# Patient Record
Sex: FEMALE | Race: WHITE | URBAN - METROPOLITAN AREA
[De-identification: names, ages, dates, MRNs, and addresses within clinical notes are randomized per-mention and may not be internally consistent; named-entity substitution may affect disease eponyms.]

---

## 2022-07-26 ENCOUNTER — WEB ENCOUNTER (OUTPATIENT)
Dept: URBAN - METROPOLITAN AREA CLINIC 70 | Facility: CLINIC | Age: 72
End: 2022-07-26

## 2022-07-26 ENCOUNTER — LAB OUTSIDE AN ENCOUNTER (OUTPATIENT)
Dept: URBAN - METROPOLITAN AREA CLINIC 70 | Facility: CLINIC | Age: 72
End: 2022-07-26

## 2022-07-26 ENCOUNTER — DASHBOARD ENCOUNTERS (OUTPATIENT)
Age: 72
End: 2022-07-26

## 2022-07-26 ENCOUNTER — OFFICE VISIT (OUTPATIENT)
Dept: URBAN - METROPOLITAN AREA CLINIC 70 | Facility: CLINIC | Age: 72
End: 2022-07-26
Payer: MEDICARE

## 2022-07-26 VITALS
HEART RATE: 75 BPM | BODY MASS INDEX: 28.1 KG/M2 | HEIGHT: 63 IN | WEIGHT: 158.6 LBS | TEMPERATURE: 97.6 F | SYSTOLIC BLOOD PRESSURE: 150 MMHG | DIASTOLIC BLOOD PRESSURE: 82 MMHG

## 2022-07-26 DIAGNOSIS — Z80.0 FAMILY HX OF COLON CANCER: ICD-10-CM

## 2022-07-26 DIAGNOSIS — K21.9 GASTROESOPHAGEAL REFLUX DISEASE, UNSPECIFIED WHETHER ESOPHAGITIS PRESENT: ICD-10-CM

## 2022-07-26 DIAGNOSIS — R11.2 NAUSEA AND VOMITING, UNSPECIFIED VOMITING TYPE: ICD-10-CM

## 2022-07-26 PROCEDURE — 99204 OFFICE O/P NEW MOD 45 MIN: CPT

## 2022-07-26 RX ORDER — ESOMEPRAZOLE MAGNESIUM 40 MG/1
TAKE 1 CAPSULE BY MOUTH ONCE DAILY CAPSULE, DELAYED RELEASE ORAL
Qty: 90 EACH | Refills: 0 | Status: ACTIVE | COMMUNITY

## 2022-07-26 RX ORDER — LISINOPRIL 40 MG/1
TAKE 1 TABLET BY MOUTH ONCE DAILY TABLET ORAL
Qty: 90 EACH | Refills: 0 | Status: ACTIVE | COMMUNITY

## 2022-07-26 RX ORDER — ASPIRIN 81 MG/1
TAKE 1 TABLET BY MOUTH ONCE DAILY TABLET ORAL
Qty: 30 EACH | Refills: 0 | Status: ACTIVE | COMMUNITY

## 2022-07-26 RX ORDER — ESOMEPRAZOLE MAGNESIUM 40 MG/1
TAKE 1 CAPSULE BY MOUTH ONCE DAILY CAPSULE, DELAYED RELEASE ORAL
OUTPATIENT

## 2022-07-26 RX ORDER — FLUOXETINE 20 MG/1
TAKE 1 CAPSULE BY MOUTH ONCE DAILY CAPSULE ORAL
Qty: 90 EACH | Refills: 0 | Status: ACTIVE | COMMUNITY

## 2022-07-26 RX ORDER — AMLODIPINE BESYLATE 5 MG/1
TAKE 1 TABLET BY MOUTH ONCE DAILY TABLET ORAL
Qty: 90 EACH | Refills: 0 | Status: ACTIVE | COMMUNITY

## 2022-07-26 RX ORDER — PANTOPRAZOLE SODIUM 40 MG/1
TAKE 1 TABLET IN THE MORNING ON AN EMPTY STOMACH, WAIT 30 MINUTES, THEN START EATING TABLET, DELAYED RELEASE ORAL ONCE A DAY
Qty: 90 | Refills: 3 | OUTPATIENT
Start: 2022-07-26

## 2022-07-26 RX ORDER — MONTELUKAST SODIUM 10 MG/1
TAKE 1 TABLET BY MOUTH ONCE DAILY TABLET, FILM COATED ORAL
Qty: 90 EACH | Refills: 0 | Status: ACTIVE | COMMUNITY

## 2022-07-26 NOTE — HPI-TODAY'S VISIT:
The patient presents for colon cancer screening and GERD. Last colonoscopy was done in 2011 and was normal per the pt.  Her father was diagnosed with colon cancer at age 68.  They deny any abdominal pain, diarrhea, constipation, rectal bleeding, or weight loss. She admits to a hx of GERD and states she has been taking Nexium 40mg for multiple years.  She states over the last few months, she has experienced increased breakthrough symptoms including indigestion and nausea. She denies melena, dysphagia, or regurgitation.

## 2022-08-23 PROBLEM — 235595009: Status: ACTIVE | Noted: 2022-07-26

## 2022-08-26 ENCOUNTER — OFFICE VISIT (OUTPATIENT)
Dept: URBAN - METROPOLITAN AREA SURGERY CENTER 24 | Facility: SURGERY CENTER | Age: 72
End: 2022-08-26

## 2022-09-19 ENCOUNTER — OFFICE VISIT (OUTPATIENT)
Dept: URBAN - METROPOLITAN AREA SURGERY CENTER 24 | Facility: SURGERY CENTER | Age: 72
End: 2022-09-19